# Patient Record
Sex: FEMALE | Race: ASIAN | NOT HISPANIC OR LATINO | URBAN - METROPOLITAN AREA
[De-identification: names, ages, dates, MRNs, and addresses within clinical notes are randomized per-mention and may not be internally consistent; named-entity substitution may affect disease eponyms.]

---

## 2022-04-13 ENCOUNTER — EMERGENCY (EMERGENCY)
Facility: HOSPITAL | Age: 32
LOS: 1 days | Discharge: LEFT BEFORE DISCHARGE | End: 2022-04-13
Payer: SELF-PAY

## 2022-04-13 VITALS
RESPIRATION RATE: 16 BRPM | TEMPERATURE: 98 F | OXYGEN SATURATION: 95 % | DIASTOLIC BLOOD PRESSURE: 78 MMHG | SYSTOLIC BLOOD PRESSURE: 115 MMHG | HEART RATE: 67 BPM

## 2022-04-13 PROCEDURE — 99283 EMERGENCY DEPT VISIT LOW MDM: CPT

## 2022-04-13 NOTE — ED ADULT NURSE NOTE - EXPLANATION OF PATIENT'S REASON FOR LEAVING
Pt refused to be triage and wanted to go home. Seen and Assessed by Dr. Blackman. Patient is non suicidal/ homicidal. Vitals are stable, no distress.

## 2022-04-14 NOTE — ED PROVIDER NOTE - CLINICAL SUMMARY MEDICAL DECISION MAKING FREE TEXT BOX
pt states she would like to go home as she feels well now  does not want any physical examination   appears calm cooperative and has a means to get home   pt is not a danger to herself or others at this time.

## 2022-04-14 NOTE — ED PROVIDER NOTE - PATIENT PORTAL LINK FT
You can access the FollowMyHealth Patient Portal offered by [facility] by registering at the following website: [email or web address]. By joining [facility]’s FollowMyHealth portal, you will also be able to view your health information using other applications (apps) compatible with our system. patient

## 2023-06-19 NOTE — ED PROVIDER NOTE - OBJECTIVE STATEMENT
"I don't want to be here"  pt brought from [facility] airport after running up and down aisle of a plane that was ready to depart as she wanted to get off the plane  she states she was very anxious at the time has had stress over pending divorce and lives in [place] and just wants to go home.  denies si/hi denies ah/vh denies feeling anxious at this time.  does not want a physical examination does not want to sign any papers   pt is calm cooperative speech is clear non-pressured she states she can get an uber back to [place]    pt will allow for a set of vital signs but not other evaluation.  states she does not want to get a bill The patient is a 78y Female complaining of altered mental status.